# Patient Record
Sex: FEMALE | Race: BLACK OR AFRICAN AMERICAN | URBAN - METROPOLITAN AREA
[De-identification: names, ages, dates, MRNs, and addresses within clinical notes are randomized per-mention and may not be internally consistent; named-entity substitution may affect disease eponyms.]

---

## 2021-08-17 ENCOUNTER — OFFICE VISIT (OUTPATIENT)
Dept: URBAN - METROPOLITAN AREA CLINIC 6 | Facility: CLINIC | Age: 61
End: 2021-08-17
Payer: MEDICARE

## 2021-08-17 PROCEDURE — 92133 CPTRZD OPH DX IMG PST SGM ON: CPT | Performed by: OPTOMETRIST

## 2021-08-17 PROCEDURE — 92012 INTRM OPH EXAM EST PATIENT: CPT | Performed by: OPTOMETRIST

## 2021-08-17 PROCEDURE — 92083 EXTENDED VISUAL FIELD XM: CPT | Performed by: OPTOMETRIST

## 2021-08-17 ASSESSMENT — INTRAOCULAR PRESSURE
OS: 19
OD: 18

## 2021-08-17 NOTE — IMPRESSION/PLAN
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 H/O multiple strokes, which affected the right eye more than left. Known hypercoagulative state. Plan: repeat OCT RNFL shows S/I thinning OS with 69-unable obtain OD due to unintentional cooperation/difficulty due to wheelchair. HVF 24-2 extremely unreliable OU with complete scotoma OD (CF vision) and inferior>superior defects OS. IOP OU WNL. Plan to d/c all subjective testings from here on out. Monitor.

## 2022-02-10 ENCOUNTER — OFFICE VISIT (OUTPATIENT)
Dept: URBAN - METROPOLITAN AREA CLINIC 6 | Facility: CLINIC | Age: 62
End: 2022-02-10
Payer: COMMERCIAL

## 2022-02-10 DIAGNOSIS — H17.89 OTHER CORNEAL SCARS AND OPACITIES: ICD-10-CM

## 2022-02-10 DIAGNOSIS — H25.813 COMBINED FORMS OF AGE-RELATED CATARACT, BILATERAL: Primary | ICD-10-CM

## 2022-02-10 DIAGNOSIS — H47.293 OTHER OPTIC ATROPHY, BILATERAL: ICD-10-CM

## 2022-02-10 DIAGNOSIS — H04.123 TEAR FILM INSUFFICIENCY OF BILATERAL LACRIMAL GLANDS: ICD-10-CM

## 2022-02-10 PROCEDURE — 92133 CPTRZD OPH DX IMG PST SGM ON: CPT | Performed by: OPTOMETRIST

## 2022-02-10 PROCEDURE — 92014 COMPRE OPH EXAM EST PT 1/>: CPT | Performed by: OPTOMETRIST

## 2022-02-10 ASSESSMENT — INTRAOCULAR PRESSURE
OS: 14
OD: 14

## 2022-02-10 NOTE — IMPRESSION/PLAN
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 H/O multiple strokes, which affected the right eye more than left. Known hypercoagulative state. Plan: repeat OCT RNFL shows S thinning OD and N/I thinning OS with 7/44-poor scan OD>OS due to unintentional cooperation/difficulty due to wheelchair. IOP OU WNL. Plan to d/c all subjective testings from here on out. Monitor.

## 2022-02-10 NOTE — IMPRESSION/PLAN
Impression: Tear film insufficiency of bilateral lacrimal glands: H04.123. Plan: Moderate dry eye both eyes - Recommend use of high quality artificial tears 3-4 x daily and lubricant ointment or gel at bedtime. If does not improve will consider placing punctal plugs and/or adding Restasis. Advised lid taping at night OD.

## 2023-02-13 ENCOUNTER — OFFICE VISIT (OUTPATIENT)
Dept: URBAN - METROPOLITAN AREA CLINIC 6 | Facility: CLINIC | Age: 63
End: 2023-02-13
Payer: MEDICARE

## 2023-02-13 DIAGNOSIS — H25.813 COMBINED FORMS OF AGE-RELATED CATARACT, BILATERAL: ICD-10-CM

## 2023-02-13 DIAGNOSIS — H47.293 OTHER OPTIC ATROPHY, BILATERAL: Primary | ICD-10-CM

## 2023-02-13 DIAGNOSIS — H17.89 OTHER CORNEAL SCARS AND OPACITIES: ICD-10-CM

## 2023-02-13 DIAGNOSIS — H04.123 TEAR FILM INSUFFICIENCY OF BILATERAL LACRIMAL GLANDS: ICD-10-CM

## 2023-02-13 PROCEDURE — 92014 COMPRE OPH EXAM EST PT 1/>: CPT | Performed by: OPTOMETRIST

## 2023-02-13 NOTE — IMPRESSION/PLAN
Impression: Tear film insufficiency of bilateral lacrimal glands: H04.123. Plan: Moderate dry eye both eyes - Recommend use of high quality artificial tears 3-4 x daily and lubricant ointment or gel at bedtime. If does not improve will consider placing punctal plugs and/or adding Restasis. Sample Refresh given.

## 2023-02-13 NOTE — IMPRESSION/PLAN
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 H/O multiple strokes, which affected the right eye more than left. Known hypercoagulative state. Plan: IOP OU WNL. Stable. Monitor.

## 2024-04-30 ENCOUNTER — OFFICE VISIT (OUTPATIENT)
Dept: URBAN - METROPOLITAN AREA CLINIC 6 | Facility: CLINIC | Age: 64
End: 2024-04-30
Payer: MEDICARE

## 2024-04-30 DIAGNOSIS — H47.293 OTHER OPTIC ATROPHY, BILATERAL: Primary | ICD-10-CM

## 2024-04-30 DIAGNOSIS — H17.89 OTHER CORNEAL SCARS AND OPACITIES: ICD-10-CM

## 2024-04-30 DIAGNOSIS — H25.813 COMBINED FORMS OF AGE-RELATED CATARACT, BILATERAL: ICD-10-CM

## 2024-04-30 DIAGNOSIS — H04.123 TEAR FILM INSUFFICIENCY OF BILATERAL LACRIMAL GLANDS: ICD-10-CM

## 2024-04-30 PROCEDURE — 92014 COMPRE OPH EXAM EST PT 1/>: CPT | Performed by: OPTOMETRIST

## 2024-04-30 ASSESSMENT — INTRAOCULAR PRESSURE
OS: 10
OD: 10

## 2024-05-10 ENCOUNTER — TECH ONLY (OUTPATIENT)
Dept: URBAN - METROPOLITAN AREA CLINIC 6 | Facility: CLINIC | Age: 64
End: 2024-05-10
Payer: MEDICARE

## 2024-05-10 DIAGNOSIS — H25.813 COMBINED FORMS OF AGE-RELATED CATARACT, BILATERAL: Primary | ICD-10-CM

## 2024-05-24 ENCOUNTER — POST-OPERATIVE VISIT (OUTPATIENT)
Dept: URBAN - METROPOLITAN AREA CLINIC 5 | Facility: CLINIC | Age: 64
End: 2024-05-24
Payer: MEDICARE

## 2024-05-24 DIAGNOSIS — Z96.1 PRESENCE OF INTRAOCULAR LENS: Primary | ICD-10-CM

## 2024-05-24 PROCEDURE — 99024 POSTOP FOLLOW-UP VISIT: CPT | Performed by: OPTOMETRIST

## 2024-05-24 RX ORDER — NEOMYCIN SULFATE, POLYMYXIN B SULFATE AND DEXAMETHASONE 3.5; 10000; 1 MG/G; [USP'U]/G; MG/G
OINTMENT OPHTHALMIC
Qty: 3.5 | Refills: 0 | Status: ACTIVE
Start: 2024-05-24

## 2024-05-24 ASSESSMENT — INTRAOCULAR PRESSURE: OS: 19

## 2024-05-31 ENCOUNTER — POST-OPERATIVE VISIT (OUTPATIENT)
Dept: URBAN - METROPOLITAN AREA CLINIC 6 | Facility: CLINIC | Age: 64
End: 2024-05-31
Payer: MEDICARE

## 2024-05-31 DIAGNOSIS — Z48.810 ENCOUNTER FOR SURGICAL AFTERCARE FOLLOWING SURGERY ON A SENSE ORGAN: Primary | ICD-10-CM

## 2024-05-31 PROCEDURE — 99024 POSTOP FOLLOW-UP VISIT: CPT | Performed by: OPTOMETRIST

## 2024-05-31 ASSESSMENT — INTRAOCULAR PRESSURE
OS: 8
OD: 14

## 2024-06-28 ENCOUNTER — POST-OPERATIVE VISIT (OUTPATIENT)
Dept: URBAN - METROPOLITAN AREA CLINIC 6 | Facility: CLINIC | Age: 64
End: 2024-06-28
Payer: MEDICARE

## 2024-06-28 DIAGNOSIS — Z48.810 ENCOUNTER FOR SURGICAL AFTERCARE FOLLOWING SURGERY ON A SENSE ORGAN: Primary | ICD-10-CM

## 2024-06-28 PROCEDURE — 99024 POSTOP FOLLOW-UP VISIT: CPT | Performed by: OPTOMETRIST

## 2024-06-28 ASSESSMENT — INTRAOCULAR PRESSURE
OD: 15
OS: 14

## 2024-09-27 ENCOUNTER — OFFICE VISIT (OUTPATIENT)
Dept: URBAN - METROPOLITAN AREA CLINIC 6 | Facility: CLINIC | Age: 64
End: 2024-09-27
Payer: MEDICARE

## 2024-09-27 DIAGNOSIS — H26.492 OTHER SECONDARY CATARACT, LEFT EYE: ICD-10-CM

## 2024-09-27 DIAGNOSIS — H25.811 COMBINED FORMS OF AGE-RELATED CATARACT, RIGHT EYE: ICD-10-CM

## 2024-09-27 DIAGNOSIS — H17.89 OTHER CORNEAL SCARS AND OPACITIES: ICD-10-CM

## 2024-09-27 DIAGNOSIS — H04.123 TEAR FILM INSUFFICIENCY OF BILATERAL LACRIMAL GLANDS: ICD-10-CM

## 2024-09-27 DIAGNOSIS — H47.293 OTHER OPTIC ATROPHY, BILATERAL: Primary | ICD-10-CM

## 2024-09-27 PROCEDURE — 99214 OFFICE O/P EST MOD 30 MIN: CPT | Performed by: OPTOMETRIST

## 2024-09-27 ASSESSMENT — INTRAOCULAR PRESSURE
OD: 19
OS: 16